# Patient Record
Sex: MALE | Race: WHITE | Employment: OTHER | URBAN - METROPOLITAN AREA
[De-identification: names, ages, dates, MRNs, and addresses within clinical notes are randomized per-mention and may not be internally consistent; named-entity substitution may affect disease eponyms.]

---

## 2018-07-25 ENCOUNTER — APPOINTMENT (OUTPATIENT)
Dept: CT IMAGING | Age: 68
End: 2018-07-25
Payer: MEDICARE

## 2018-07-25 ENCOUNTER — HOSPITAL ENCOUNTER (OUTPATIENT)
Age: 68
Setting detail: OBSERVATION
Discharge: HOME | End: 2018-07-26
Attending: EMERGENCY MEDICINE | Admitting: INTERNAL MEDICINE
Payer: MEDICARE

## 2018-07-25 DIAGNOSIS — G45.4 TGA (TRANSIENT GLOBAL AMNESIA): Primary | ICD-10-CM

## 2018-07-25 PROBLEM — R41.82 ALTERED MENTAL STATUS: Status: ACTIVE | Noted: 2018-07-25

## 2018-07-25 PROBLEM — I10 HTN (HYPERTENSION), BENIGN: Chronic | Status: ACTIVE | Noted: 2018-07-25

## 2018-07-25 PROBLEM — E06.3 AUTOIMMUNE HYPOTHYROIDISM: Chronic | Status: ACTIVE | Noted: 2018-07-25

## 2018-07-25 PROBLEM — R41.82 ALTERED MENTAL STATE: Status: ACTIVE | Noted: 2018-07-25

## 2018-07-25 LAB
ALBUMIN SERPL-MCNC: 4 G/DL (ref 3.5–5.2)
ALP BLD-CCNC: 78 U/L (ref 40–129)
ALT SERPL-CCNC: 19 U/L (ref 0–40)
ANION GAP SERPL CALCULATED.3IONS-SCNC: 11 MMOL/L (ref 7–16)
AST SERPL-CCNC: 19 U/L (ref 0–39)
BASOPHILS ABSOLUTE: 0.08 E9/L (ref 0–0.2)
BASOPHILS RELATIVE PERCENT: 1.1 % (ref 0–2)
BILIRUB SERPL-MCNC: 0.2 MG/DL (ref 0–1.2)
BILIRUBIN URINE: NEGATIVE
BLOOD, URINE: NEGATIVE
BUN BLDV-MCNC: 20 MG/DL (ref 8–23)
CALCIUM SERPL-MCNC: 9.3 MG/DL (ref 8.6–10.2)
CHLORIDE BLD-SCNC: 105 MMOL/L (ref 98–107)
CLARITY: CLEAR
CO2: 27 MMOL/L (ref 22–29)
COLOR: YELLOW
CREAT SERPL-MCNC: 1.4 MG/DL (ref 0.7–1.2)
EKG ATRIAL RATE: 77 BPM
EKG P AXIS: 40 DEGREES
EKG P-R INTERVAL: 176 MS
EKG Q-T INTERVAL: 386 MS
EKG QRS DURATION: 82 MS
EKG QTC CALCULATION (BAZETT): 436 MS
EKG T AXIS: 43 DEGREES
EKG VENTRICULAR RATE: 77 BPM
EOSINOPHILS ABSOLUTE: 0.1 E9/L (ref 0.05–0.5)
EOSINOPHILS RELATIVE PERCENT: 1.4 % (ref 0–6)
GFR AFRICAN AMERICAN: >60
GFR NON-AFRICAN AMERICAN: 50 ML/MIN/1.73
GLUCOSE BLD-MCNC: 103 MG/DL (ref 74–109)
GLUCOSE URINE: NEGATIVE MG/DL
HCT VFR BLD CALC: 40.1 % (ref 37–54)
HEMOGLOBIN: 13.7 G/DL (ref 12.5–16.5)
IMMATURE GRANULOCYTES #: 0.02 E9/L
IMMATURE GRANULOCYTES %: 0.3 % (ref 0–5)
KETONES, URINE: NEGATIVE MG/DL
LEUKOCYTE ESTERASE, URINE: NEGATIVE
LYMPHOCYTES ABSOLUTE: 0.73 E9/L (ref 1.5–4)
LYMPHOCYTES RELATIVE PERCENT: 10.4 % (ref 20–42)
MCH RBC QN AUTO: 31.6 PG (ref 26–35)
MCHC RBC AUTO-ENTMCNC: 34.2 % (ref 32–34.5)
MCV RBC AUTO: 92.4 FL (ref 80–99.9)
MONOCYTES ABSOLUTE: 0.54 E9/L (ref 0.1–0.95)
MONOCYTES RELATIVE PERCENT: 7.7 % (ref 2–12)
NEUTROPHILS ABSOLUTE: 5.53 E9/L (ref 1.8–7.3)
NEUTROPHILS RELATIVE PERCENT: 79.1 % (ref 43–80)
NITRITE, URINE: NEGATIVE
PDW BLD-RTO: 12.5 FL (ref 11.5–15)
PH UA: 6.5 (ref 5–9)
PLATELET # BLD: 181 E9/L (ref 130–450)
PMV BLD AUTO: 8.8 FL (ref 7–12)
POTASSIUM SERPL-SCNC: 3.9 MMOL/L (ref 3.5–5)
PROTEIN UA: NEGATIVE MG/DL
RBC # BLD: 4.34 E12/L (ref 3.8–5.8)
SODIUM BLD-SCNC: 143 MMOL/L (ref 132–146)
SPECIFIC GRAVITY UA: 1.02 (ref 1–1.03)
TOTAL PROTEIN: 6.6 G/DL (ref 6.4–8.3)
TROPONIN: <0.01 NG/ML (ref 0–0.03)
UROBILINOGEN, URINE: 0.2 E.U./DL
WBC # BLD: 7 E9/L (ref 4.5–11.5)

## 2018-07-25 PROCEDURE — G0378 HOSPITAL OBSERVATION PER HR: HCPCS

## 2018-07-25 PROCEDURE — 81003 URINALYSIS AUTO W/O SCOPE: CPT

## 2018-07-25 PROCEDURE — 93005 ELECTROCARDIOGRAM TRACING: CPT | Performed by: EMERGENCY MEDICINE

## 2018-07-25 PROCEDURE — 70450 CT HEAD/BRAIN W/O DYE: CPT

## 2018-07-25 PROCEDURE — 2580000003 HC RX 258: Performed by: INTERNAL MEDICINE

## 2018-07-25 PROCEDURE — 99285 EMERGENCY DEPT VISIT HI MDM: CPT

## 2018-07-25 PROCEDURE — 80053 COMPREHEN METABOLIC PANEL: CPT

## 2018-07-25 PROCEDURE — 84484 ASSAY OF TROPONIN QUANT: CPT

## 2018-07-25 PROCEDURE — 87088 URINE BACTERIA CULTURE: CPT

## 2018-07-25 PROCEDURE — 85025 COMPLETE CBC W/AUTO DIFF WBC: CPT

## 2018-07-25 PROCEDURE — 81001 URINALYSIS AUTO W/SCOPE: CPT

## 2018-07-25 RX ORDER — ONDANSETRON 2 MG/ML
4 INJECTION INTRAMUSCULAR; INTRAVENOUS EVERY 6 HOURS PRN
Status: DISCONTINUED | OUTPATIENT
Start: 2018-07-25 | End: 2018-07-26 | Stop reason: HOSPADM

## 2018-07-25 RX ORDER — LISINOPRIL 5 MG/1
5 TABLET ORAL DAILY
Status: DISCONTINUED | OUTPATIENT
Start: 2018-07-26 | End: 2018-07-26 | Stop reason: HOSPADM

## 2018-07-25 RX ORDER — LEVOTHYROXINE SODIUM 0.05 MG/1
50 TABLET ORAL DAILY
Status: DISCONTINUED | OUTPATIENT
Start: 2018-07-26 | End: 2018-07-26 | Stop reason: HOSPADM

## 2018-07-25 RX ORDER — SODIUM CHLORIDE 0.9 % (FLUSH) 0.9 %
10 SYRINGE (ML) INJECTION PRN
Status: DISCONTINUED | OUTPATIENT
Start: 2018-07-25 | End: 2018-07-26 | Stop reason: HOSPADM

## 2018-07-25 RX ORDER — ACETAMINOPHEN 325 MG/1
650 TABLET ORAL EVERY 4 HOURS PRN
Status: DISCONTINUED | OUTPATIENT
Start: 2018-07-25 | End: 2018-07-26 | Stop reason: HOSPADM

## 2018-07-25 RX ORDER — ASPIRIN 81 MG/1
81 TABLET ORAL DAILY
Status: DISCONTINUED | OUTPATIENT
Start: 2018-07-26 | End: 2018-07-26 | Stop reason: HOSPADM

## 2018-07-25 RX ORDER — SODIUM CHLORIDE 9 MG/ML
INJECTION, SOLUTION INTRAVENOUS CONTINUOUS
Status: DISCONTINUED | OUTPATIENT
Start: 2018-07-25 | End: 2018-07-26 | Stop reason: HOSPADM

## 2018-07-25 RX ORDER — SODIUM CHLORIDE 0.9 % (FLUSH) 0.9 %
10 SYRINGE (ML) INJECTION EVERY 12 HOURS SCHEDULED
Status: DISCONTINUED | OUTPATIENT
Start: 2018-07-25 | End: 2018-07-26 | Stop reason: HOSPADM

## 2018-07-25 RX ADMIN — SODIUM CHLORIDE: 9 INJECTION, SOLUTION INTRAVENOUS at 23:10

## 2018-07-25 RX ADMIN — Medication 10 ML: at 23:10

## 2018-07-25 ASSESSMENT — PAIN SCALES - GENERAL: PAINLEVEL_OUTOF10: 0

## 2018-07-26 ENCOUNTER — APPOINTMENT (OUTPATIENT)
Dept: ULTRASOUND IMAGING | Age: 68
End: 2018-07-26
Payer: MEDICARE

## 2018-07-26 ENCOUNTER — APPOINTMENT (OUTPATIENT)
Dept: MRI IMAGING | Age: 68
End: 2018-07-26
Payer: MEDICARE

## 2018-07-26 VITALS
OXYGEN SATURATION: 97 % | HEART RATE: 67 BPM | HEIGHT: 72 IN | BODY MASS INDEX: 31.04 KG/M2 | TEMPERATURE: 98 F | DIASTOLIC BLOOD PRESSURE: 72 MMHG | WEIGHT: 229.2 LBS | RESPIRATION RATE: 18 BRPM | SYSTOLIC BLOOD PRESSURE: 137 MMHG

## 2018-07-26 LAB
ANION GAP SERPL CALCULATED.3IONS-SCNC: 11 MMOL/L (ref 7–16)
BACTERIA: NORMAL /HPF
BILIRUBIN URINE: NEGATIVE
BLOOD, URINE: NORMAL
BUN BLDV-MCNC: 21 MG/DL (ref 8–23)
CALCIUM SERPL-MCNC: 8.9 MG/DL (ref 8.6–10.2)
CHLORIDE BLD-SCNC: 105 MMOL/L (ref 98–107)
CHOLESTEROL, TOTAL: 152 MG/DL (ref 0–199)
CLARITY: CLEAR
CO2: 26 MMOL/L (ref 22–29)
COLOR: YELLOW
CREAT SERPL-MCNC: 1.2 MG/DL (ref 0.7–1.2)
EPITHELIAL CELLS, UA: NORMAL /HPF
GFR AFRICAN AMERICAN: >60
GFR NON-AFRICAN AMERICAN: >60 ML/MIN/1.73
GLUCOSE BLD-MCNC: 96 MG/DL (ref 74–109)
GLUCOSE URINE: NEGATIVE MG/DL
HCT VFR BLD CALC: 39.1 % (ref 37–54)
HDLC SERPL-MCNC: 25 MG/DL
HEMOGLOBIN: 13.1 G/DL (ref 12.5–16.5)
KETONES, URINE: NEGATIVE MG/DL
LDL CHOLESTEROL CALCULATED: 63 MG/DL (ref 0–99)
LEUKOCYTE ESTERASE, URINE: NEGATIVE
LV EF: 65 %
LVEF MODALITY: NORMAL
MCH RBC QN AUTO: 31.4 PG (ref 26–35)
MCHC RBC AUTO-ENTMCNC: 33.5 % (ref 32–34.5)
MCV RBC AUTO: 93.8 FL (ref 80–99.9)
NITRITE, URINE: NEGATIVE
PDW BLD-RTO: 12.5 FL (ref 11.5–15)
PH UA: 6 (ref 5–9)
PLATELET # BLD: 186 E9/L (ref 130–450)
PMV BLD AUTO: 9.5 FL (ref 7–12)
POTASSIUM SERPL-SCNC: 3.7 MMOL/L (ref 3.5–5)
PROTEIN UA: NEGATIVE MG/DL
RBC # BLD: 4.17 E12/L (ref 3.8–5.8)
RBC UA: NORMAL /HPF (ref 0–2)
SEDIMENTATION RATE, ERYTHROCYTE: 11 MM/HR (ref 0–15)
SODIUM BLD-SCNC: 142 MMOL/L (ref 132–146)
SPECIFIC GRAVITY UA: 1.02 (ref 1–1.03)
TRIGL SERPL-MCNC: 322 MG/DL (ref 0–149)
TSH SERPL DL<=0.05 MIU/L-ACNC: 3.66 UIU/ML (ref 0.27–4.2)
UROBILINOGEN, URINE: 0.2 E.U./DL
VLDLC SERPL CALC-MCNC: 64 MG/DL
WBC # BLD: 6.2 E9/L (ref 4.5–11.5)
WBC UA: NORMAL /HPF (ref 0–5)

## 2018-07-26 PROCEDURE — 96372 THER/PROPH/DIAG INJ SC/IM: CPT

## 2018-07-26 PROCEDURE — 85651 RBC SED RATE NONAUTOMATED: CPT

## 2018-07-26 PROCEDURE — 93306 TTE W/DOPPLER COMPLETE: CPT

## 2018-07-26 PROCEDURE — 84443 ASSAY THYROID STIM HORMONE: CPT

## 2018-07-26 PROCEDURE — G0378 HOSPITAL OBSERVATION PER HR: HCPCS

## 2018-07-26 PROCEDURE — 6370000000 HC RX 637 (ALT 250 FOR IP): Performed by: INTERNAL MEDICINE

## 2018-07-26 PROCEDURE — 93880 EXTRACRANIAL BILAT STUDY: CPT

## 2018-07-26 PROCEDURE — 70551 MRI BRAIN STEM W/O DYE: CPT

## 2018-07-26 PROCEDURE — 80061 LIPID PANEL: CPT

## 2018-07-26 PROCEDURE — 36415 COLL VENOUS BLD VENIPUNCTURE: CPT

## 2018-07-26 PROCEDURE — 85027 COMPLETE CBC AUTOMATED: CPT

## 2018-07-26 PROCEDURE — 80048 BASIC METABOLIC PNL TOTAL CA: CPT

## 2018-07-26 PROCEDURE — 6360000002 HC RX W HCPCS: Performed by: INTERNAL MEDICINE

## 2018-07-26 RX ORDER — ASPIRIN 81 MG/1
81 TABLET ORAL DAILY
Qty: 30 TABLET | Refills: 3 | Status: SHIPPED | OUTPATIENT
Start: 2018-07-27

## 2018-07-26 RX ADMIN — LISINOPRIL 5 MG: 5 TABLET ORAL at 08:54

## 2018-07-26 RX ADMIN — ENOXAPARIN SODIUM 40 MG: 40 INJECTION, SOLUTION INTRAVENOUS; SUBCUTANEOUS at 08:54

## 2018-07-26 RX ADMIN — LEVOTHYROXINE SODIUM 50 MCG: 50 TABLET ORAL at 07:13

## 2018-07-26 RX ADMIN — ASPIRIN 81 MG: 81 TABLET, COATED ORAL at 08:54

## 2018-07-26 ASSESSMENT — PAIN SCALES - GENERAL
PAINLEVEL_OUTOF10: 0

## 2018-07-26 NOTE — PLAN OF CARE
Problem: Falls - Risk of:  Goal: Will remain free from falls  Will remain free from falls  Outcome: Met This Shift

## 2018-07-26 NOTE — H&P
History and Physical      CHIEF COMPLAINT:  Memory loss      HISTORY OF PRESENT ILLNESS:      The patient is a 76 y.o. male patient of Dr Al Yao who presents with memory loss from home. The patient self states that he apparently had a \"senior moment\" yesterday and was brought to the hospital by his wife and daughter. According to his wife yesterday she went to the grocery store and when she returned the CPAP was lying on the couch which she had been using for his back pain and he asked her why he was there. He could not remember what had happened that morning and his wife called her daughter. When the daughter arrived she asked him some questions and he cannot remember that she was getting  or that he had tried on ataxia. They brought him to the emergency room and by 6:30 he was back to normal.    She has no prior history of stroke or transient ischemic attack. She has no history of valvular heart disease and cardiac arrhythmia or chest pain. He denies any headache or blurred vision double vision dizziness or lightheadedness. He denies any fever or chills. Past Medical History:    Past Medical History:   Diagnosis Date    Cancer ([address]     ORAL    Cataract     Hypertension     Thyroid disease        Past Surgical History:    Past Surgical History:   Procedure Laterality Date    MOUTH SURGERY      ORAL CANCER       Medications Prior to Admission:    Prescriptions Prior to Admission: levothyroxine (SYNTHROID) 25 MCG tablet, Take 125 mcg by mouth Daily Instructed to take with sip water am of procedure   lisinopril (PRINIVIL;ZESTRIL) 5 MG tablet, Take 5 mg by mouth daily. Allergies:    Patient has no known allergies. Social History:    reports that he has never smoked. He has never used smokeless tobacco. He reports that he does not drink alcohol or use drugs.     Family History:     Father had a heart attack    REVIEW OF SYSTEMS    Constitutional: negative for chills and fevers  Eyes:

## 2018-07-26 NOTE — PROGRESS NOTES
2108 Dr. Chasity Maya called and has accepted pt to [facility] not at [facility]. Dr. Chasity Maya spoke to Dr. Haydee Cunningham at this time.

## 2018-07-26 NOTE — ED PROVIDER NOTES
HPI:   Taya Ravi is a 76 y.o. male presenting to the ED for altered mental status , beginning 4:00PM today. The complaint has been intermittent, moderate in severity, and worsened by nothing. family reports at 4pm that patient started forgetting short term memory things, for example forgot that his daughter was getting  and has been engaged for over 6 months but pt was able to remember long term things. Family states pt was okay before 4:00PM. Family states they have been asking pt questions and his memory seems to be back to normal. Pt denies fever, chills, nausea, vomiting, diarrhea, LOC, SOB, cough, hematuria, dysuria, headache, dizziness, cough, trauma, fall. ROS:   Pertinent positives and negatives are stated within HPI, all other systems reviewed and are negative.    --------------------------------------------- PAST HISTORY ---------------------------------------------  Past Medical History:  has a past medical history of Cancer ([facility] [address] Cataract; Hypertension; and Thyroid disease. Past Surgical History:  has a past surgical history that includes Mouth surgery. Social History:  reports that he has never smoked. He has never used smokeless tobacco. He reports that he does not drink alcohol or use drugs. Family History: family history is not on file. The patients home medications have been reviewed. Allergies: Patient has no known allergies.     -------------------------------------------------- RESULTS -------------------------------------------------  All laboratory and radiology results have been personally reviewed by myself   LABS:  Results for orders placed or performed during the hospital encounter of 07/25/18   CBC Auto Differential   Result Value Ref Range    WBC 7.0 4.5 - 11.5 E9/L    RBC 4.34 3.80 - 5.80 E12/L    Hemoglobin 13.7 12.5 - 16.5 g/dL    Hematocrit 40.1 37.0 - 54.0 %    MCV 92.4 80.0 - 99.9 fL    MCH 31.6 26.0 - 35.0 pg    MCHC 34.2 32.0 - 34.5 %    RDW 12.5 11.5 - 15.0 fL    Platelets 160 352 - 512 E9/L    MPV 8.8 7.0 - 12.0 fL    Neutrophils % 79.1 43.0 - 80.0 %    Immature Granulocytes % 0.3 0.0 - 5.0 %    Lymphocytes % 10.4 (L) 20.0 - 42.0 %    Monocytes % 7.7 2.0 - 12.0 %    Eosinophils % 1.4 0.0 - 6.0 %    Basophils % 1.1 0.0 - 2.0 %    Neutrophils # 5.53 1.80 - 7.30 E9/L    Immature Granulocytes # 0.02 E9/L    Lymphocytes # 0.73 (L) 1.50 - 4.00 E9/L    Monocytes # 0.54 0.10 - 0.95 E9/L    Eosinophils # 0.10 0.05 - 0.50 E9/L    Basophils # 0.08 0.00 - 0.20 E9/L   Comprehensive Metabolic Panel   Result Value Ref Range    Sodium 143 132 - 146 mmol/L    Potassium 3.9 3.5 - 5.0 mmol/L    Chloride 105 98 - 107 mmol/L    CO2 27 22 - 29 mmol/L    Anion Gap 11 7 - 16 mmol/L    Glucose 103 74 - 109 mg/dL    BUN 20 8 - 23 mg/dL    CREATININE 1.4 (H) 0.7 - 1.2 mg/dL    GFR Non-African American 50 >=60 mL/min/1.73    GFR African American >60     Calcium 9.3 8.6 - 10.2 mg/dL    Total Protein 6.6 6.4 - 8.3 g/dL    Alb 4.0 3.5 - 5.2 g/dL    Total Bilirubin 0.2 0.0 - 1.2 mg/dL    Alkaline Phosphatase 78 40 - 129 U/L    ALT 19 0 - 40 U/L    AST 19 0 - 39 U/L   Troponin   Result Value Ref Range    Troponin <0.01 0.00 - 0.03 ng/mL   EKG 12 Lead   Result Value Ref Range    Ventricular Rate 77 BPM    Atrial Rate 77 BPM    P-R Interval 176 ms    QRS Duration 82 ms    Q-T Interval 386 ms    QTc Calculation (Bazett) 436 ms    P Axis 40 degrees    T Axis 43 degrees       RADIOLOGY:  Interpreted by Radiologist.  CT Head WO Contrast   Final Result   No evidence of acute intracranial hemorrhage, midline shift, or mass   effect. EKG:  This EKG is signed and interpreted by the EP.     Time: 20:13  Rate: 77  Rhythm: normal sinus rhythm  Interpretation: no acute changes  Comparison: no previous EKG available    ------------------------- NURSING NOTES AND VITALS REVIEWED ---------------------------   The nursing notes within the ED encounter and vital signs as below have been

## 2018-07-27 NOTE — DISCHARGE SUMMARY
= 117 cm/s (PSV) and 24 cm/s (EDV)    Left ICA proximal = 59 cm/s (PSV) and 13 cm/s (EDV)    Left ICA mid = 72 cm/s (PSV) and 13 cm/s (EDV)    Left ICA distal = 120 cm/s (PSV) and 34 cm/s (EDV)    Left ECA = 193 cm/s (PSV) and 22 cm/s (EDV)    Left vertebral artery = 54 cm/s (PSV) and 10 cm/s (EDV)    ICA/CCA PSV ratio: 1.0       PSV > 230 cm/s corresponds to stenosis >70%, > 125 cm/s corresponds to   stenosis > 50%       EDV  cm/s corresponds to stenosis of 50-69%, 110-140 cm/s   corresponds to stenosis of 70-79% , > 140 cm/s corresponds to stenosis   > 80%       ICA/CCA PSV ratio > 4 corresponds to stenosis of greater than 70%,   3.2-4 corresponds to stenosis of 50-69%           Impression   1. Carotid Doppler ultrasound demonstrating a hemodynamically   significant stenosis (50-69%) in the bilateral external carotid   arteries based on elevated peak systolic velocity, as above. 2. No evidence of hemodynamically significant stenosis involving the   remaining carotid or vertebral arteries bilaterally, with peak   systolic and end-diastolic velocities as above. Echo Complete   7/26/18   Merlin Velazquez 76 y.o.     Reading Physician Idalmis García DO   Ordering Physician Kiko Guerrero,    Result Information     Status: Edited Result - FINAL (Resulted: 7/26/2018 14:21) Provider Status: Ordered   Echo Complete   Order: [id number]   Status:  Edited Result - FINAL   Visible to patient:  No (Not Released) Next appt:  None   Details     Reading Physician Reading Date Result Priority   Idalmis García DO 7/26/2018    Narrative     Transthoracic Echocardiography Report (TTE)     Demographics      Patient Name    JUICE      Gender            Male                   Jay Gifford      Medical Record  [id number]      Room Number       7942   Number      Account #       [de-identified]     Procedure Date    07/26/2018      Corporate ID                  Ordering         Jacrachel Dempseyimmanuel                                 Physician      Accession       [id number]     Referring         Gabriella Murdock  [facility]                        Physician      Date of Birth   1950    Sonographer       Chepe Russ RDCS      Age             68 year(s)    Interpreting     [address] [facility]                                 Physician         Physician Cardiology                                                   Opal Gomez DO                                    Any Other     Procedure    Type of Study      TTE procedure:Echo Complete W/Doppler & Color Flow.     Procedure Date  Date: 07/26/2018 Start: 11:20 AM    Study Location: Echo Lab  Technical Quality: Adequate visualization    Indications:TIA. Patient Status: Routine    Height: 72 inches Weight: 229 pounds BSA: 2.26 m^2 BMI: 31.06 kg/m^2    HR: 66 bpm BP: 178/82 mmHg     Findings      Left Ventricle   Ejection fraction is visually estimated at 65%. No regional wall motion   abnormalities seen. Normal left ventricular wall thickness. Left ventricle   size is normal. There is doppler evidence of stage I diastolic   dysfunction.      Right Ventricle   Normal right ventricle structure and function.      Left Atrium   Left atrial volume index of 26 ml per meters squared BSA.      Right Atrium   Normal right atrium.      Mitral Valve   Structurally normal mitral valve. No evidence of mitral valve stenosis.   Physiologic and/or trace mitral regurgitation is present.      Tricuspid Valve   The tricuspid valve appears structurally normal.   Physiologic and/or trace tricuspid regurgitation.      Aortic Valve   The aortic valve is trileaflet. No hemodynamically significant aortic   stenosis is present.  Trace aortic valve regurgitation.      Pulmonic Valve   The pulmonic valve was not well visualized.      Pericardial Effusion   No evidence for hemodynamically significant pericardial effusion.      Aorta   Normal aortic root and ascending aorta.   Miscellaneous   The inferior vena cava diameter is normal with normal respiratory   variation.      Conclusions      Summary   Ejection fraction is visually estimated at 65%.   No regional wall motion abnormalities seen.  Daniel Chi is doppler evidence of stage I diastolic dysfunction.   Normal right ventricle structure and function.   Trace aortic valve regurgitation.   Physiologic and/or trace mitral regurgitation is present.   Physiologic and/or trace tricuspid regurgitation.      Signature      ----------------------------------------------------------------   Electronically signed by Rebel Ngo DO(Interpreting   physician) on 07/26/2018 02:20 PM   ----------------------------------------------------------------               Laboratory:   Last 3 BMP  Recent Labs      07/25/18 1922  07/26/18   0310   NA  143  142   K  3.9  3.7   CL  105  105   CO2  27  26   BUN  20  21   CREATININE  1.4*  1.2   GLUCOSE  103  96   CALCIUM  9.3  8.9       Last 3 CBC:  Recent Labs      07/25/18 1922 07/26/18   0310   WBC  7.0  6.2   RBC  4.34  4.17   HGB  13.7  13.1   HCT  40.1  39.1   MCV  92.4  93.8   MCH  31.6  31.4   MCHC  34.2  33.5   RDW  12.5  12.5   PLT  181  186   MPV  8.8  9.5       No results for input(s): PROTIME, INR in the last 72 hours. Hospital Course: The patient is a 76 y.o. male patient of Dr Sage Ha who presents with memory loss from home. The patient self states that he apparently had a \"senior moment\" yesterday and was brought to the hospital by his wife and daughter. According to his wife yesterday she went to the grocery store and when she returned the CPAP was lying on the couch which she had been using for his back pain and he asked her why he was there. He could not remember what had happened that morning and his wife called her daughter. When the daughter arrived she asked him some questions and he cannot remember that she was getting  or that he had tried on ataxia.  They brought him to the emergency room and by 6:30 he was back to normal.     He has no prior history of stroke or transient ischemic attack. She has no history of valvular heart disease and cardiac arrhythmia or chest pain. He denies any headache or blurred vision double vision dizziness or lightheadedness. He denies any fever or chills. Underwent extensive evaluation which was unremarkable; discharged home on aspirin and instructed to follow up with PCP next week    Discharge Exam:  See progress note from today    Disposition: home    Patient Instructions:   Discharge Medication List as of 7/26/2018  6:15 PM      START taking these medications    Details   aspirin 81 MG EC tablet Take 1 tablet by mouth daily, Disp-30 tablet, R-3Normal         CONTINUE these medications which have NOT CHANGED    Details   levothyroxine (SYNTHROID) 25 MCG tablet Take 125 mcg by mouth Daily Instructed to take with sip water am of procedure Historical Med      lisinopril (PRINIVIL;ZESTRIL) 5 MG tablet Take 5 mg by mouth daily. Historical Med           Activity: activity as tolerated  Diet: regular diet    Follow-up with Dr Xena Levy in 1 week. Note that over 30 minutes was spent in preparing discharge papers, discussing discharge with patient, medication review, etc.    Signed:  SERAFIN Hartley  7/27/2018  8:14 AM

## 2018-07-28 LAB
URINE CULTURE, ROUTINE: NORMAL
URINE CULTURE, ROUTINE: NORMAL

## 2020-08-17 ENCOUNTER — HOSPITAL ENCOUNTER (OUTPATIENT)
Age: 70
Discharge: HOME | End: 2020-08-19

## 2020-08-17 PROCEDURE — 88305 TISSUE EXAM BY PATHOLOGIST: CPT

## 2024-02-06 ENCOUNTER — HOSPITAL ENCOUNTER (OUTPATIENT)
Age: 74
Discharge: HOME | End: 2024-02-06
Payer: MEDICARE

## 2024-02-06 LAB
ALBUMIN SERPL-MCNC: 4.4 G/DL (ref 3.5–5.2)
ALP SERPL-CCNC: 97 U/L (ref 40–129)
ALT SERPL-CCNC: 17 U/L (ref 0–40)
ANION GAP SERPL CALCULATED.3IONS-SCNC: 10 MMOL/L (ref 7–16)
AST SERPL-CCNC: 17 U/L (ref 0–39)
BASOPHILS # BLD: 0.09 K/UL (ref 0–0.2)
BASOPHILS NFR BLD: 1 % (ref 0–2)
BILIRUB SERPL-MCNC: 0.4 MG/DL (ref 0–1.2)
BILIRUB UR QL STRIP: NEGATIVE
BUN SERPL-MCNC: 17 MG/DL (ref 6–23)
CALCIUM SERPL-MCNC: 10.2 MG/DL (ref 8.6–10.2)
CHLORIDE SERPL-SCNC: 103 MMOL/L (ref 98–107)
CHOLEST SERPL-MCNC: 133 MG/DL
CLARITY UR: CLEAR
CO2 SERPL-SCNC: 28 MMOL/L (ref 22–29)
COLOR UR: YELLOW
CREAT SERPL-MCNC: 1.2 MG/DL (ref 0.7–1.2)
CRYSTALS URNS MICRO: ABNORMAL /HPF
EOSINOPHIL # BLD: 0.32 K/UL (ref 0.05–0.5)
EOSINOPHILS RELATIVE PERCENT: 5 % (ref 0–6)
ERYTHROCYTE [DISTWIDTH] IN BLOOD BY AUTOMATED COUNT: 13 % (ref 11.5–15)
GFR SERPL CREATININE-BSD FRML MDRD: >60 ML/MIN/1.73M2
GLUCOSE SERPL-MCNC: 88 MG/DL (ref 74–99)
GLUCOSE UR STRIP-MCNC: NEGATIVE MG/DL
HCT VFR BLD AUTO: 45.2 % (ref 37–54)
HDLC SERPL-MCNC: 35 MG/DL
HGB BLD-MCNC: 14.9 G/DL (ref 12.5–16.5)
HGB UR QL STRIP.AUTO: ABNORMAL
IMM GRANULOCYTES # BLD AUTO: 0.05 K/UL (ref 0–0.58)
IMM GRANULOCYTES NFR BLD: 1 % (ref 0–5)
KETONES UR STRIP-MCNC: NEGATIVE MG/DL
LDLC SERPL CALC-MCNC: 54 MG/DL
LEUKOCYTE ESTERASE UR QL STRIP: NEGATIVE
LYMPHOCYTES NFR BLD: 1.52 K/UL (ref 1.5–4)
LYMPHOCYTES RELATIVE PERCENT: 22 % (ref 20–42)
MCH RBC QN AUTO: 31.1 PG (ref 26–35)
MCHC RBC AUTO-ENTMCNC: 33 G/DL (ref 32–34.5)
MCV RBC AUTO: 94.4 FL (ref 80–99.9)
MONOCYTES NFR BLD: 0.93 K/UL (ref 0.1–0.95)
MONOCYTES NFR BLD: 14 % (ref 2–12)
NEUTROPHILS NFR BLD: 57 % (ref 43–80)
NEUTS SEG NFR BLD: 3.87 K/UL (ref 1.8–7.3)
NITRITE UR QL STRIP: NEGATIVE
PH UR STRIP: 6 [PH] (ref 5–9)
PLATELET # BLD AUTO: 194 K/UL (ref 130–450)
PMV BLD AUTO: 8.4 FL (ref 7–12)
POTASSIUM SERPL-SCNC: 4.1 MMOL/L (ref 3.5–5)
PROT SERPL-MCNC: 7.5 G/DL (ref 6.4–8.3)
PROT UR STRIP-MCNC: NEGATIVE MG/DL
PSA SERPL-MCNC: 1.49 NG/ML (ref 0–4)
RBC # BLD AUTO: 4.79 M/UL (ref 3.8–5.8)
RBC #/AREA URNS HPF: ABNORMAL /HPF
SODIUM SERPL-SCNC: 141 MMOL/L (ref 132–146)
SP GR UR STRIP: >1.03 (ref 1–1.03)
TRIGL SERPL-MCNC: 220 MG/DL
TSH SERPL DL<=0.05 MIU/L-ACNC: 0.07 UIU/ML (ref 0.27–4.2)
UROBILINOGEN UR STRIP-ACNC: 0.2 EU/DL (ref 0–1)
VLDLC SERPL CALC-MCNC: 44 MG/DL
WBC #/AREA URNS HPF: ABNORMAL /HPF
WBC OTHER # BLD: 6.8 K/UL (ref 4.5–11.5)

## 2024-02-06 PROCEDURE — 84443 ASSAY THYROID STIM HORMONE: CPT

## 2024-02-06 PROCEDURE — G0103 PSA SCREENING: HCPCS

## 2024-02-06 PROCEDURE — 36415 COLL VENOUS BLD VENIPUNCTURE: CPT

## 2024-02-06 PROCEDURE — 85025 COMPLETE CBC W/AUTO DIFF WBC: CPT

## 2024-02-06 PROCEDURE — 80053 COMPREHEN METABOLIC PANEL: CPT

## 2024-02-06 PROCEDURE — 81001 URINALYSIS AUTO W/SCOPE: CPT

## 2024-02-06 PROCEDURE — 80061 LIPID PANEL: CPT

## 2024-11-27 ENCOUNTER — HOSPITAL ENCOUNTER (OUTPATIENT)
Dept: ULTRASOUND IMAGING | Age: 74
Discharge: HOME | End: 2024-11-29
Payer: MEDICARE

## 2024-11-27 DIAGNOSIS — R30.0 DYSURIA: ICD-10-CM

## 2024-11-27 DIAGNOSIS — Z13.6 SCREENING FOR HYPERTENSION: ICD-10-CM

## 2024-11-27 DIAGNOSIS — N41.0 ACUTE PROSTATITIS: ICD-10-CM

## 2024-11-27 PROCEDURE — 76706 US ABDL AORTA SCREEN AAA: CPT

## 2024-12-04 ENCOUNTER — TELEPHONE (OUTPATIENT)
Dept: VASCULAR SURGERY | Age: 74
End: 2024-12-04

## 2024-12-04 NOTE — TELEPHONE ENCOUNTER
Received referral from Dr. Hernandez regarding AAA, left message for patient to return call to schedule.

## 2024-12-09 ENCOUNTER — TELEPHONE (OUTPATIENT)
Dept: VASCULAR SURGERY | Age: 74
End: 2024-12-09

## 2025-01-07 ENCOUNTER — OFFICE VISIT (OUTPATIENT)
Dept: VASCULAR SURGERY | Age: 75
End: 2025-01-07
Payer: MEDICARE

## 2025-01-07 DIAGNOSIS — I65.23 BILATERAL CAROTID ARTERY STENOSIS: ICD-10-CM

## 2025-01-07 DIAGNOSIS — I71.43 INFRARENAL ABDOMINAL AORTIC ANEURYSM (AAA) WITHOUT RUPTURE (HCC): Primary | ICD-10-CM

## 2025-01-07 PROCEDURE — M1308 PR FLU IMMUNIZE NO ADMIN: HCPCS

## 2025-01-07 PROCEDURE — G8421 BMI NOT CALCULATED: HCPCS

## 2025-01-07 PROCEDURE — 1159F MED LIST DOCD IN RCRD: CPT

## 2025-01-07 PROCEDURE — 3017F COLORECTAL CA SCREEN DOC REV: CPT

## 2025-01-07 PROCEDURE — 4004F PT TOBACCO SCREEN RCVD TLK: CPT

## 2025-01-07 PROCEDURE — 99203 OFFICE O/P NEW LOW 30 MIN: CPT

## 2025-01-07 PROCEDURE — G8427 DOCREV CUR MEDS BY ELIG CLIN: HCPCS

## 2025-01-07 PROCEDURE — 1123F ACP DISCUSS/DSCN MKR DOCD: CPT

## 2025-01-07 RX ORDER — SIMVASTATIN 5 MG
5 TABLET ORAL NIGHTLY
COMMUNITY

## 2025-01-07 RX ORDER — CITALOPRAM HYDROBROMIDE 10 MG/1
10 TABLET ORAL DAILY
COMMUNITY

## 2025-01-07 NOTE — PROGRESS NOTES
extra-ocular muscles intact and vision intact  ENT:  normocepalic, without obvious abnormality, atraumatic  NECK:  supple, symmetrical, trachea midline, no jugular venous distension   LUNGS:  no increased work of breathing, good air exchange and clear to auscultation  CARDIOVASCULAR:  regular rate and rhythm    ABDOMEN:  soft, non-distended, non-tender, Aorta not palpated  SKIN:  no lesions  EXTREMITIES: none  edema BLE            Right      Left   Brachial     Radial 2 2   Femoral     Popliteal     Dorsalis Pedis     Posterior Tibial     (3=normal, 2=diminished, 1=barely palpable, 4=widened)      IMPRESSION/RECOMMENDATIONS:        Problem List Items Addressed This Visit          Circulatory    Bilateral carotid artery stenosis    Relevant Orders    Vascular duplex carotid bilateral    Infrarenal abdominal aortic aneurysm (AAA) without rupture (HCC) - Primary    Relevant Orders    Vascular duplex abdominal aorta       I reviewed with the pt the natural progression of AAA.  His aneurysm is measuring 4.6 cm.  There is no indication for surgical intervention at this time.  I asked him to remain on asa and statin therapy.  I will plan to see him back in 6 months with a repeat ultrasound of the aorta.  We will also repeat his carotid ultrasound since he has not had a one since 2018.  I asked him to call sooner with any new issues or problems.  Pt and his daughter understand and agree with the plan.     Pt seen and plan reviewed with Dr. Carol Simpson, APRN - CNP

## 2025-06-02 ENCOUNTER — TELEPHONE (OUTPATIENT)
Dept: VASCULAR SURGERY | Age: 75
End: 2025-06-02

## 2025-06-02 NOTE — TELEPHONE ENCOUNTER
Message left on pt's voicemail for a return call to schedule an ultrasound abdominal aorta and carotid prior to his next ov with Dr. Medina.

## 2025-06-03 ENCOUNTER — TELEPHONE (OUTPATIENT)
Dept: VASCULAR SURGERY | Age: 75
End: 2025-06-03

## 2025-06-03 NOTE — TELEPHONE ENCOUNTER
Scheduled AAA ultrasound and carotid ultrasound at [facility] 7/1/25 beginning at 11:00 a.m.  Message left on pt's answering machine to report to register at 10:30 a.m and to be NPO after midnight.

## 2025-07-01 ENCOUNTER — HOSPITAL ENCOUNTER (OUTPATIENT)
Dept: ULTRASOUND IMAGING | Age: 75
Discharge: HOME | End: 2025-07-03
Payer: MEDICARE

## 2025-07-01 DIAGNOSIS — I65.23 BILATERAL CAROTID ARTERY STENOSIS: ICD-10-CM

## 2025-07-01 DIAGNOSIS — I71.43 INFRARENAL ABDOMINAL AORTIC ANEURYSM (AAA) WITHOUT RUPTURE: ICD-10-CM

## 2025-07-01 PROCEDURE — 93880 EXTRACRANIAL BILAT STUDY: CPT

## 2025-07-01 PROCEDURE — 93976 VASCULAR STUDY: CPT

## 2025-07-22 ENCOUNTER — OFFICE VISIT (OUTPATIENT)
Dept: VASCULAR SURGERY | Age: 75
End: 2025-07-22
Payer: MEDICARE

## 2025-07-22 DIAGNOSIS — I65.23 BILATERAL CAROTID ARTERY STENOSIS: ICD-10-CM

## 2025-07-22 DIAGNOSIS — I71.43 INFRARENAL ABDOMINAL AORTIC ANEURYSM (AAA) WITHOUT RUPTURE: Primary | ICD-10-CM

## 2025-07-22 PROCEDURE — G8421 BMI NOT CALCULATED: HCPCS | Performed by: SURGERY

## 2025-07-22 PROCEDURE — 4004F PT TOBACCO SCREEN RCVD TLK: CPT | Performed by: SURGERY

## 2025-07-22 PROCEDURE — G8427 DOCREV CUR MEDS BY ELIG CLIN: HCPCS | Performed by: SURGERY

## 2025-07-22 PROCEDURE — 1159F MED LIST DOCD IN RCRD: CPT | Performed by: SURGERY

## 2025-07-22 PROCEDURE — 99213 OFFICE O/P EST LOW 20 MIN: CPT | Performed by: SURGERY

## 2025-07-22 PROCEDURE — 3017F COLORECTAL CA SCREEN DOC REV: CPT | Performed by: SURGERY

## 2025-07-22 PROCEDURE — 1123F ACP DISCUSS/DSCN MKR DOCD: CPT | Performed by: SURGERY

## 2025-07-22 NOTE — PROGRESS NOTES
[x]/Yes []    PHYSICAL EXAM:  There were no vitals filed for this visit.  General Appearance: alert and oriented to person, place and time, in no acute distress, well developed and well- nourished  Neurologic: no cranial nerve deficit, speech normal  Head: normocephalic and atraumatic  Eyes: extraocular eye movements intact, conjunctivae normal  ENT: external ear and ear canal normal bilaterally, nose without deformity, no carotid bruits, scarring from previous surgery and radiation  Pulmonary/Chest: normal air movement, no respiratory distress  Cardiovascular: normal rate, regular rhythm, no murmur  Abdomen: non-distended, no masses  Musculoskeletal: no joint deformity or tenderness  Extremities: no leg edema bilaterally  Skin: warm and dry, no rash or erythema    PULSE EXAM      Right      Left   Brachial     Radial     Femoral     Popliteal     Dorsalis Pedis     Posterior Tibial     (3=normal, 2=diminished, 1=barely palpable, 4=widened)    RADIOLOGY: [facility] today    Problem List Items Addressed This Visit       Bilateral carotid artery stenosis    Relevant Orders    Vascular duplex carotid bilateral    Infrarenal abdominal aortic aneurysm (AAA) without rupture - Primary    Relevant Orders    Vascular duplex abdominal aorta       We repeated an ultrasound of the abdominal aorta that measures the aneurysm at 4.5 cm.  He also has mild stenosis of the left internal carotid artery but not significant given his history of radiation.  I reviewed with the patient that from my standpoint he can continue with all normal activities.  I will plan to see him again in one year but asked him to call sooner with any new problems.            Return in about 1 year (around 7/22/2026) for testing.